# Patient Record
Sex: MALE | Race: WHITE
[De-identification: names, ages, dates, MRNs, and addresses within clinical notes are randomized per-mention and may not be internally consistent; named-entity substitution may affect disease eponyms.]

---

## 2020-06-08 ENCOUNTER — APPOINTMENT (OUTPATIENT)
Dept: RADIOLOGY | Facility: HOSPITAL | Age: 65
End: 2020-06-08
Payer: COMMERCIAL

## 2020-06-08 ENCOUNTER — OUTPATIENT (OUTPATIENT)
Dept: OUTPATIENT SERVICES | Facility: HOSPITAL | Age: 65
LOS: 1 days | End: 2020-06-08
Payer: COMMERCIAL

## 2020-06-08 DIAGNOSIS — M25.512 PAIN IN LEFT SHOULDER: ICD-10-CM

## 2020-06-08 DIAGNOSIS — M25.511 PAIN IN RIGHT SHOULDER: ICD-10-CM

## 2020-06-08 PROBLEM — Z00.00 ENCOUNTER FOR PREVENTIVE HEALTH EXAMINATION: Status: ACTIVE | Noted: 2020-06-08

## 2020-06-08 PROCEDURE — 73030 X-RAY EXAM OF SHOULDER: CPT

## 2020-06-08 PROCEDURE — 73030 X-RAY EXAM OF SHOULDER: CPT | Mod: 26,50

## 2021-12-24 ENCOUNTER — EMERGENCY (EMERGENCY)
Facility: HOSPITAL | Age: 66
LOS: 1 days | Discharge: HOME | End: 2021-12-24
Attending: EMERGENCY MEDICINE
Payer: MEDICARE

## 2021-12-24 VITALS
HEART RATE: 63 BPM | WEIGHT: 182.98 LBS | OXYGEN SATURATION: 96 % | RESPIRATION RATE: 20 BRPM | SYSTOLIC BLOOD PRESSURE: 135 MMHG | HEIGHT: 69 IN | TEMPERATURE: 98 F | DIASTOLIC BLOOD PRESSURE: 76 MMHG

## 2021-12-24 LAB
ALBUMIN SERPL ELPH-MCNC: 4.8 G/DL — SIGNIFICANT CHANGE UP (ref 3.3–5)
ALP SERPL-CCNC: 69 U/L — SIGNIFICANT CHANGE UP (ref 40–120)
ALT FLD-CCNC: 17 U/L — SIGNIFICANT CHANGE UP (ref 10–45)
ANION GAP SERPL CALC-SCNC: 15 MMOL/L — SIGNIFICANT CHANGE UP (ref 5–17)
AST SERPL-CCNC: 21 U/L — SIGNIFICANT CHANGE UP (ref 10–40)
BASOPHILS # BLD AUTO: 0.03 K/UL — SIGNIFICANT CHANGE UP (ref 0–0.2)
BASOPHILS NFR BLD AUTO: 0.4 % — SIGNIFICANT CHANGE UP (ref 0–2)
BILIRUB SERPL-MCNC: 0.2 MG/DL — SIGNIFICANT CHANGE UP (ref 0.2–1.2)
BUN SERPL-MCNC: 19 MG/DL — SIGNIFICANT CHANGE UP (ref 7–23)
CALCIUM SERPL-MCNC: 9.2 MG/DL — SIGNIFICANT CHANGE UP (ref 8.4–10.5)
CHLORIDE SERPL-SCNC: 107 MMOL/L — SIGNIFICANT CHANGE UP (ref 96–108)
CO2 SERPL-SCNC: 19 MMOL/L — LOW (ref 22–31)
CREAT SERPL-MCNC: 1.23 MG/DL — SIGNIFICANT CHANGE UP (ref 0.5–1.3)
CRP SERPL-MCNC: 11 MG/L — HIGH (ref 0–4)
EOSINOPHIL # BLD AUTO: 0.09 K/UL — SIGNIFICANT CHANGE UP (ref 0–0.5)
EOSINOPHIL NFR BLD AUTO: 1.3 % — SIGNIFICANT CHANGE UP (ref 0–6)
GLUCOSE SERPL-MCNC: 84 MG/DL — SIGNIFICANT CHANGE UP (ref 70–99)
HCT VFR BLD CALC: 42.1 % — SIGNIFICANT CHANGE UP (ref 39–50)
HGB BLD-MCNC: 13.8 G/DL — SIGNIFICANT CHANGE UP (ref 13–17)
IMM GRANULOCYTES NFR BLD AUTO: 0.1 % — SIGNIFICANT CHANGE UP (ref 0–1.5)
LYMPHOCYTES # BLD AUTO: 2.28 K/UL — SIGNIFICANT CHANGE UP (ref 1–3.3)
LYMPHOCYTES # BLD AUTO: 34.1 % — SIGNIFICANT CHANGE UP (ref 13–44)
MCHC RBC-ENTMCNC: 28.9 PG — SIGNIFICANT CHANGE UP (ref 27–34)
MCHC RBC-ENTMCNC: 32.8 GM/DL — SIGNIFICANT CHANGE UP (ref 32–36)
MCV RBC AUTO: 88.1 FL — SIGNIFICANT CHANGE UP (ref 80–100)
MONOCYTES # BLD AUTO: 0.49 K/UL — SIGNIFICANT CHANGE UP (ref 0–0.9)
MONOCYTES NFR BLD AUTO: 7.3 % — SIGNIFICANT CHANGE UP (ref 2–14)
NEUTROPHILS # BLD AUTO: 3.79 K/UL — SIGNIFICANT CHANGE UP (ref 1.8–7.4)
NEUTROPHILS NFR BLD AUTO: 56.8 % — SIGNIFICANT CHANGE UP (ref 43–77)
NRBC # BLD: 0 /100 WBCS — SIGNIFICANT CHANGE UP (ref 0–0)
PLATELET # BLD AUTO: 306 K/UL — SIGNIFICANT CHANGE UP (ref 150–400)
POTASSIUM SERPL-MCNC: 5.1 MMOL/L — SIGNIFICANT CHANGE UP (ref 3.5–5.3)
POTASSIUM SERPL-SCNC: 5.1 MMOL/L — SIGNIFICANT CHANGE UP (ref 3.5–5.3)
PROT SERPL-MCNC: 7.2 G/DL — SIGNIFICANT CHANGE UP (ref 6–8.3)
RBC # BLD: 4.78 M/UL — SIGNIFICANT CHANGE UP (ref 4.2–5.8)
RBC # FLD: 11.9 % — SIGNIFICANT CHANGE UP (ref 10.3–14.5)
SODIUM SERPL-SCNC: 141 MMOL/L — SIGNIFICANT CHANGE UP (ref 135–145)
WBC # BLD: 6.69 K/UL — SIGNIFICANT CHANGE UP (ref 3.8–10.5)
WBC # FLD AUTO: 6.69 K/UL — SIGNIFICANT CHANGE UP (ref 3.8–10.5)

## 2021-12-24 PROCEDURE — 99218: CPT

## 2021-12-24 PROCEDURE — 73630 X-RAY EXAM OF FOOT: CPT | Mod: 26,RT

## 2021-12-24 RX ORDER — ACETAMINOPHEN 500 MG
975 TABLET ORAL EVERY 6 HOURS
Refills: 0 | Status: DISCONTINUED | OUTPATIENT
Start: 2021-12-24 | End: 2021-12-28

## 2021-12-24 RX ORDER — KETOROLAC TROMETHAMINE 30 MG/ML
15 SYRINGE (ML) INJECTION ONCE
Refills: 0 | Status: DISCONTINUED | OUTPATIENT
Start: 2021-12-24 | End: 2021-12-24

## 2021-12-24 RX ORDER — CEFAZOLIN SODIUM 1 G
2000 VIAL (EA) INJECTION EVERY 8 HOURS
Refills: 0 | Status: DISCONTINUED | OUTPATIENT
Start: 2021-12-24 | End: 2021-12-24

## 2021-12-24 RX ORDER — CEFAZOLIN SODIUM 1 G
2000 VIAL (EA) INJECTION EVERY 8 HOURS
Refills: 0 | Status: DISCONTINUED | OUTPATIENT
Start: 2021-12-24 | End: 2021-12-28

## 2021-12-24 RX ADMIN — Medication 15 MILLIGRAM(S): at 23:33

## 2021-12-24 RX ADMIN — Medication 100 MILLIGRAM(S): at 22:55

## 2021-12-24 RX ADMIN — Medication 975 MILLIGRAM(S): at 23:33

## 2021-12-24 RX ADMIN — Medication 2000 MILLIGRAM(S): at 23:31

## 2021-12-24 NOTE — CONSULT NOTE ADULT - ASSESSMENT
66M s/p R foot 1st MPJ replacement 2 weeks ago in [place]  - pt seen and evaluated  - afebrile, no leukocytosis, ESR CRP pending  - Exam: R foot incision overlying 1st MPJ with no necrosis or dehiscence, right foot 1st interspace maceration noted with 2 sutures on the medial aspect of the 2nd digit at the level of the DIPJ with erythema., mild serous drainage and 1 suture noted on the lateral aspect of the hallux IPJ with periwound erythema, serous drainage, no malodor. Removal of sutures on 2nd digit revealed wound probing to capsule, no tunneling or tracking noted, on hallux s/p suture removal, wound probing to capsule as well, no tunneling undermining or tracking noted.   - R foot xray:  Patient status post first MTP joint replacement. The proximal grommet is displaced distally from the first metatarsal attachment site in all views. The underlying cortical margin is irregular. The distal grommet  is displaced proximally from the proximal phalanx attachemetn site in all views. The underlying cortical margins are sharp. No tracking subcutaneous gas. Given cortical irregularity at the distal first metatarsal margin, there is concern for underlying osteomyelitis.  - verbal consent obtained for removal of sutures in interspace  - using sterile suture removal kit, 2 sutures from the medial aspect of R 2nd digit and 1 suture from lateral aspect of R hallux were removed  - Wound were flushed with copious amount of normal saline  - wound culture was taken  - recommend admit to CDU  - recommend IV antibiotics  - ordered RF MRI to rule out underlying OM  - patient will most likely need a revisional surgery after resolution of cellulitis, had a detailed discussion with patient of diff treatment options, patient demonstrated understanding of all options and prognosis and agrees with plan  - discussed with attending

## 2021-12-24 NOTE — ED ADULT NURSE NOTE - PRIMARY CARE PROVIDER
Detail Level: Simple
Introduction Text (Please End With A Colon): The following procedure was deferred:
unknown

## 2021-12-24 NOTE — ED PROVIDER NOTE - PROGRESS NOTE DETAILS
Umer-PGY3: pt received at sign-out, seen and evaluated at bedside.  Discussed with podiatry re: consult, pending recs. Umer-PGY3: discussed with podiatry, recommending MRI foot r/o osteo as XR may show post-surgical changes. Discussed with CDU PA, pending eval.

## 2021-12-24 NOTE — ED ADULT TRIAGE NOTE - CHIEF COMPLAINT QUOTE
Foul smelling discharge and pain in R foot second toe s/p MTP joint replacement 2 weeks ago in [place].  Took Tylenol and Advil for pain at 12:00 with minimal relief

## 2021-12-24 NOTE — ED PROVIDER NOTE - CLINICAL SUMMARY MEDICAL DECISION MAKING FREE TEXT BOX
65 y/o male with no pmhx presenting with foul smelling discharge and pain in R foot second toe s/p MTP joint replacement 2 weeks ago in [place] with associated swelling and erythema; yellow discharge in between digits at surgical site, no crepitus. X-ray for low suspicion of osteomyelitis vs nec fasc. Basic labs with ESR/CRP, podiatry consult and reassess

## 2021-12-24 NOTE — ED PROVIDER NOTE - ATTENDING CONTRIBUTION TO CARE
Patient presenting with R great toe redness and foul smelling drainage.  Had recent 1st MTPJ replacement in [place], now developing above symptoms, no podiatrist/orthopedist in [place].  No systemic symptoms.  On exam patient L great toe erythematous/swollen, skin break down between 1st and 2nd toe in web space with purulent drainage.  Will obtain plain films, ESR/CRP, consult podiatry.  Suspect with need antibiotics for cellulitis if no further intervention indicated per podiatry recs.

## 2021-12-24 NOTE — ED PROVIDER NOTE - OBJECTIVE STATEMENT
65 y/o male with no pmhx presenting with right toe infection. Reports foul smelling discharge and pain in R foot second toe s/p MTP joint replacement 2 weeks ago in [place] with associated swelling and erythema. Took Tylenol and Advil for pain at 12:00 with minimal relief. No fevers/chills, n/v/d, cough. Ambulates without difficulty

## 2021-12-24 NOTE — CONSULT NOTE ADULT - SUBJECTIVE AND OBJECTIVE BOX
Patient is a 66y old  Male who presents with a chief complaint of     HPI:      PAST MEDICAL & SURGICAL HISTORY:      MEDICATIONS  (STANDING):  ceFAZolin   IVPB 2000 milliGRAM(s) IV Intermittent every 8 hours    MEDICATIONS  (PRN):      Allergies    No Known Allergies    Intolerances        VITALS:    Vital Signs Last 24 Hrs  T(C): 36.6 (24 Dec 2021 18:52), Max: 36.6 (24 Dec 2021 15:55)  T(F): 97.8 (24 Dec 2021 18:52), Max: 97.9 (24 Dec 2021 15:55)  HR: 68 (24 Dec 2021 22:58) (63 - 68)  BP: 160/99 (24 Dec 2021 22:58) (135/76 - 160/99)  BP(mean): --  RR: 18 (24 Dec 2021 22:58) (18 - 20)  SpO2: 98% (24 Dec 2021 22:58) (96% - 98%)    LABS:                          13.8   6.69  )-----------( 306      ( 24 Dec 2021 18:54 )             42.1       12-24    141  |  107  |  19  ----------------------------<  84  5.1   |  19<L>  |  1.23    Ca    9.2      24 Dec 2021 18:54    TPro  7.2  /  Alb  4.8  /  TBili  0.2  /  DBili  x   /  AST  21  /  ALT  17  /  AlkPhos  69  12-24      CAPILLARY BLOOD GLUCOSE              LOWER EXTREMITY PHYSICAL EXAM:    Vascular: DP/PT 2/4, B/L, CFT <3 seconds B/L, Temperature gradient warm to warm, B/L.   Neuro: Epicritic sensation intact to the level of digits, B/L.  Musculoskeletal/Ortho: R foot edema from hallux and 2nd digit  Skin: R foot incision overlying 1st MPJ with no necrosis or dehiscence, right foot 1st interspace maceration noted with 2 sutures on the medial aspect of the 2nd digit at the level of the DIPJ with erythema., mild serous drainage and 1 suture noted on the lateral aspect of the hallux IPJ with periwound erythema, serous drainage, no malodor. Removal of sutures on 2nd digit revealed wound probing to capsule, no tunneling or tracking noted, on hallux s/p suture removal, wound probing to capsule as well, no tunneling undermining or tracking noted.       RADIOLOGY & ADDITIONAL STUDIES:  < from: Xray Foot AP + Lateral + Oblique, Right (12.24.21 @ 19:14) >  ******PRELIMINARY REPORT******      ******PRELIMINARY REPORT******       ACC: [id number] EXAM:  XR FOOT COMP MIN 3 VIEWS RT                          PROCEDURE DATE:  12/24/2021    ******PRELIMINARY REPORT******      ******PRELIMINARY REPORT******           INTERPRETATION:  Patient status post first MTP joint replacement.  The proximal grommet is displaced distally from the first metatarsal   attachement site in all views. The underlying cortical margin is   irregular.  The distal grommet  is displaced proximally from the proximal phalanx   attachemetn site in all views. The underlying cortical margins are sharp.  No tracking subcutaneous gas.  Given cortical irregularity at the distal first metatarsal margin, there   is concern for underlyingosteomyelitis.    ADDENDUM: Additonally, there is cortical irregularity/erosion at the   medial second IP joint on the AP view.        ******PRELIMINARY REPORT******      ******PRELIMINARY REPORT******       LÁZARO OLIVEIRA MD; Resident Radiology    < end of copied text >     Patient is a 66y old  Male who presents with a chief complaint of right foot pain    HPI:  67 y/o male with no pmhx presenting with right toe infection. Reports foul smelling discharge and pain in R foot second toe s/p MTP joint replacement 2 weeks ago in [place] with associated swelling and erythema. Took Tylenol and Advil for pain at 12:00 with minimal relief. No fevers/chills, n/v/d, cough. Ambulates without difficulty    PAST MEDICAL & SURGICAL HISTORY:      MEDICATIONS  (STANDING):  ceFAZolin   IVPB 2000 milliGRAM(s) IV Intermittent every 8 hours    MEDICATIONS  (PRN):      Allergies    No Known Allergies    Intolerances        VITALS:    Vital Signs Last 24 Hrs  T(C): 36.6 (24 Dec 2021 18:52), Max: 36.6 (24 Dec 2021 15:55)  T(F): 97.8 (24 Dec 2021 18:52), Max: 97.9 (24 Dec 2021 15:55)  HR: 68 (24 Dec 2021 22:58) (63 - 68)  BP: 160/99 (24 Dec 2021 22:58) (135/76 - 160/99)  BP(mean): --  RR: 18 (24 Dec 2021 22:58) (18 - 20)  SpO2: 98% (24 Dec 2021 22:58) (96% - 98%)    LABS:                          13.8   6.69  )-----------( 306      ( 24 Dec 2021 18:54 )             42.1       12-24    141  |  107  |  19  ----------------------------<  84  5.1   |  19<L>  |  1.23    Ca    9.2      24 Dec 2021 18:54    TPro  7.2  /  Alb  4.8  /  TBili  0.2  /  DBili  x   /  AST  21  /  ALT  17  /  AlkPhos  69  12-24      CAPILLARY BLOOD GLUCOSE              LOWER EXTREMITY PHYSICAL EXAM:    Vascular: DP/PT 2/4, B/L, CFT <3 seconds B/L, Temperature gradient warm to warm, B/L.   Neuro: Epicritic sensation intact to the level of digits, B/L.  Musculoskeletal/Ortho: R foot edema from hallux and 2nd digit  Skin: R foot incision overlying 1st MPJ with no necrosis or dehiscence, right foot 1st interspace maceration noted with 2 sutures on the medial aspect of the 2nd digit at the level of the DIPJ with erythema., mild serous drainage and 1 suture noted on the lateral aspect of the hallux IPJ with periwound erythema, serous drainage, no malodor. Removal of sutures on 2nd digit revealed wound probing to capsule, no tunneling or tracking noted, on hallux s/p suture removal, wound probing to capsule as well, no tunneling undermining or tracking noted.       RADIOLOGY & ADDITIONAL STUDIES:  < from: Xray Foot AP + Lateral + Oblique, Right (12.24.21 @ 19:14) >  ******PRELIMINARY REPORT******      ******PRELIMINARY REPORT******       ACC: [id number] EXAM:  XR FOOT COMP MIN 3 VIEWS RT                          PROCEDURE DATE:  12/24/2021    ******PRELIMINARY REPORT******      ******PRELIMINARY REPORT******           INTERPRETATION:  Patient status post first MTP joint replacement.  The proximal grommet is displaced distally from the first metatarsal   attachement site in all views. The underlying cortical margin is   irregular.  The distal grommet  is displaced proximally from the proximal phalanx   attachemetn site in all views. The underlying cortical margins are sharp.  No tracking subcutaneous gas.  Given cortical irregularity at the distal first metatarsal margin, there   is concern for underlyingosteomyelitis.    ADDENDUM: Additonally, there is cortical irregularity/erosion at the   medial second IP joint on the AP view.        ******PRELIMINARY REPORT******      ******PRELIMINARY REPORT******       LÁZARO OLIVEIRA MD; Resident Radiology    < end of copied text >

## 2021-12-25 DIAGNOSIS — M20.21 HALLUX RIGIDUS, RIGHT FOOT: Chronic | ICD-10-CM

## 2021-12-25 LAB
ALBUMIN SERPL ELPH-MCNC: 4 G/DL — SIGNIFICANT CHANGE UP (ref 3.3–5)
ALP SERPL-CCNC: 59 U/L — SIGNIFICANT CHANGE UP (ref 40–120)
ALT FLD-CCNC: 15 U/L — SIGNIFICANT CHANGE UP (ref 10–45)
ANION GAP SERPL CALC-SCNC: 12 MMOL/L — SIGNIFICANT CHANGE UP (ref 5–17)
AST SERPL-CCNC: 14 U/L — SIGNIFICANT CHANGE UP (ref 10–40)
BASOPHILS # BLD AUTO: 0.03 K/UL — SIGNIFICANT CHANGE UP (ref 0–0.2)
BASOPHILS NFR BLD AUTO: 0.5 % — SIGNIFICANT CHANGE UP (ref 0–2)
BILIRUB SERPL-MCNC: 0.3 MG/DL — SIGNIFICANT CHANGE UP (ref 0.2–1.2)
BUN SERPL-MCNC: 19 MG/DL — SIGNIFICANT CHANGE UP (ref 7–23)
CALCIUM SERPL-MCNC: 9 MG/DL — SIGNIFICANT CHANGE UP (ref 8.4–10.5)
CHLORIDE SERPL-SCNC: 107 MMOL/L — SIGNIFICANT CHANGE UP (ref 96–108)
CO2 SERPL-SCNC: 21 MMOL/L — LOW (ref 22–31)
CREAT SERPL-MCNC: 1.05 MG/DL — SIGNIFICANT CHANGE UP (ref 0.5–1.3)
CRP SERPL-MCNC: 9 MG/L — HIGH (ref 0–4)
EOSINOPHIL # BLD AUTO: 0.07 K/UL — SIGNIFICANT CHANGE UP (ref 0–0.5)
EOSINOPHIL NFR BLD AUTO: 1.2 % — SIGNIFICANT CHANGE UP (ref 0–6)
ERYTHROCYTE [SEDIMENTATION RATE] IN BLOOD: 46 MM/HR — HIGH (ref 0–20)
ERYTHROCYTE [SEDIMENTATION RATE] IN BLOOD: 49 MM/HR — HIGH (ref 0–20)
ERYTHROCYTE [SEDIMENTATION RATE] IN BLOOD: 49 MM/HR — HIGH (ref 0–20)
GLUCOSE SERPL-MCNC: 105 MG/DL — HIGH (ref 70–99)
HCT VFR BLD CALC: 38.3 % — LOW (ref 39–50)
HGB BLD-MCNC: 12.8 G/DL — LOW (ref 13–17)
IMM GRANULOCYTES NFR BLD AUTO: 0.2 % — SIGNIFICANT CHANGE UP (ref 0–1.5)
LYMPHOCYTES # BLD AUTO: 1.62 K/UL — SIGNIFICANT CHANGE UP (ref 1–3.3)
LYMPHOCYTES # BLD AUTO: 27.5 % — SIGNIFICANT CHANGE UP (ref 13–44)
MCHC RBC-ENTMCNC: 29 PG — SIGNIFICANT CHANGE UP (ref 27–34)
MCHC RBC-ENTMCNC: 33.4 GM/DL — SIGNIFICANT CHANGE UP (ref 32–36)
MCV RBC AUTO: 86.7 FL — SIGNIFICANT CHANGE UP (ref 80–100)
MONOCYTES # BLD AUTO: 0.41 K/UL — SIGNIFICANT CHANGE UP (ref 0–0.9)
MONOCYTES NFR BLD AUTO: 6.9 % — SIGNIFICANT CHANGE UP (ref 2–14)
NEUTROPHILS # BLD AUTO: 3.76 K/UL — SIGNIFICANT CHANGE UP (ref 1.8–7.4)
NEUTROPHILS NFR BLD AUTO: 63.7 % — SIGNIFICANT CHANGE UP (ref 43–77)
NRBC # BLD: 0 /100 WBCS — SIGNIFICANT CHANGE UP (ref 0–0)
PLATELET # BLD AUTO: 267 K/UL — SIGNIFICANT CHANGE UP (ref 150–400)
POTASSIUM SERPL-MCNC: 4.1 MMOL/L — SIGNIFICANT CHANGE UP (ref 3.5–5.3)
POTASSIUM SERPL-SCNC: 4.1 MMOL/L — SIGNIFICANT CHANGE UP (ref 3.5–5.3)
PROT SERPL-MCNC: 6.7 G/DL — SIGNIFICANT CHANGE UP (ref 6–8.3)
RBC # BLD: 4.42 M/UL — SIGNIFICANT CHANGE UP (ref 4.2–5.8)
RBC # FLD: 11.9 % — SIGNIFICANT CHANGE UP (ref 10.3–14.5)
SARS-COV-2 RNA SPEC QL NAA+PROBE: SIGNIFICANT CHANGE UP
SODIUM SERPL-SCNC: 140 MMOL/L — SIGNIFICANT CHANGE UP (ref 135–145)
WBC # BLD: 5.9 K/UL — SIGNIFICANT CHANGE UP (ref 3.8–10.5)
WBC # FLD AUTO: 5.9 K/UL — SIGNIFICANT CHANGE UP (ref 3.8–10.5)

## 2021-12-25 PROCEDURE — 73720 MRI LWR EXTREMITY W/O&W/DYE: CPT | Mod: 26,RT,ME

## 2021-12-25 PROCEDURE — G1004: CPT

## 2021-12-25 PROCEDURE — 99226: CPT

## 2021-12-25 RX ORDER — KETOROLAC TROMETHAMINE 30 MG/ML
15 SYRINGE (ML) INJECTION ONCE
Refills: 0 | Status: DISCONTINUED | OUTPATIENT
Start: 2021-12-25 | End: 2021-12-25

## 2021-12-25 RX ORDER — MUPIROCIN 20 MG/G
1 OINTMENT TOPICAL ONCE
Refills: 0 | Status: COMPLETED | OUTPATIENT
Start: 2021-12-25 | End: 2021-12-25

## 2021-12-25 RX ADMIN — Medication 100 MILLIGRAM(S): at 13:54

## 2021-12-25 RX ADMIN — Medication 15 MILLIGRAM(S): at 05:17

## 2021-12-25 RX ADMIN — Medication 100 MILLIGRAM(S): at 06:01

## 2021-12-25 RX ADMIN — Medication 15 MILLIGRAM(S): at 13:53

## 2021-12-25 RX ADMIN — Medication 2000 MILLIGRAM(S): at 06:31

## 2021-12-25 RX ADMIN — Medication 15 MILLIGRAM(S): at 00:03

## 2021-12-25 RX ADMIN — Medication 975 MILLIGRAM(S): at 00:03

## 2021-12-25 RX ADMIN — Medication 15 MILLIGRAM(S): at 06:51

## 2021-12-25 RX ADMIN — Medication 15 MILLIGRAM(S): at 21:00

## 2021-12-25 RX ADMIN — Medication 15 MILLIGRAM(S): at 20:30

## 2021-12-25 RX ADMIN — Medication 100 MILLIGRAM(S): at 22:16

## 2021-12-25 RX ADMIN — MUPIROCIN 1 APPLICATION(S): 20 OINTMENT TOPICAL at 05:16

## 2021-12-25 NOTE — ED CDU PROVIDER SUBSEQUENT DAY NOTE - PROGRESS NOTE DETAILS
CDU NOTE SAGRARIO Gamez: VSS NAD. Patient is resting comfortably reports improved redness and pain. Seen my podiatry this am who agreed with plan to continue IV abx and MRI for further eval. Pt will likely need joint revision in furture Attending Elba: pt seen and examined on AM rounds w/ PA. Well appearing, VSS. Pt reporting symptoms have improved since yesterday. Podiatry resident at bedside to evaluate wound, feels erythema is improving. Pt to go MRI now. Dispo pending MR results. Pt MRI resulted with "Findings of septic arthritis/osteomyelitis at the first digit interphalangeal joint and at the second digit proximal interphalangeal joint, with overlying soft tissue ulceration and phlegmon. No drainable abscess." Additional findings suggestive of post-op changes. Spoke to Podiatry resident who reviewed images with their attending. States they do not feel patient has septic joint or osteo given clinical improvement of cellulitis and pain with Ancef. Request second night in observation with plan to reevaluate in am and likely d/c home w/ PO abx if pt continues to improve clinically.   Alexa Gamez PA-C Pt in NAD. VS stable from last reading.  Will continue to monitor. Pt seen at bedside. Pt in NAD, comfortable. VS stable from last reading. Pt has no complaints at this time, reports his R hallux/foot pain has improved since arrival. Pt NVI. Will continue to monitor. Pt aware of plan for overnight observation with podiatry reeval in the AM.

## 2021-12-25 NOTE — ED CDU PROVIDER SUBSEQUENT DAY NOTE - PHYSICAL EXAMINATION
GEN: Pt in NAD. Non-toxic.  PSYCH: Affect appropriate.  EYES: Sclera white w/o injection.   ENT: Head NCAT. Neck supple FROM.  RESP: CTA b/l  CARDIAC: RRR  ABD: Abdomen soft, non-tender.  MSK: FROM b/l LE without pain.   VASC: 2+ radial and dorsalis pedis pulses b/l. No edema or calf tenderness.  NEURO: Normal and equal sensation and 5/5 strength b/l LE.  SKIN: Incision overlying 1st MTP joint, no dehiscence. +maceration of R 1st web space and lateral aspect of 2nd digit. Non-malodorous serous drainage without purulence. Mild overlying erythema of the hallux extending to the region ot the 1st MTP, no crepitus or notable induration/fluctuance, R hallux and 2nd digit TTP. GEN: Pt in NAD. Non-toxic.  PSYCH: Affect appropriate.  EYES: Sclera white w/o injection.   ENT: Head NCAT. Neck supple FROM.  RESP: CTA b/l  CARDIAC: RRR  ABD: Abdomen soft, non-tender.  MSK: FROM b/l LE without pain.   VASC: 2+ radial and dorsalis pedis pulses b/l. No edema or calf tenderness.  NEURO: Normal and equal sensation and 5/5 strength b/l LE.  SKIN: Incision overlying 1st MTP joint, no dehiscence. Incision on medial aspect of hallux with dehiscence. +maceration of R 1st web space and lateral aspect of 2nd digit. Non-malodorous serous drainage without purulence. Mild overlying erythema of the hallux extending to the region of the 1st MTP, no crepitus or notable induration/fluctuance, R hallux and 2nd digit TTP.

## 2021-12-25 NOTE — ED CDU PROVIDER DISPOSITION NOTE - CLINICAL COURSE
65 y/o male with no pmhx presenting with right toe infection. Reports foul smelling discharge and pain in R foot second toe s/p MTP joint replacement 2 weeks ago in [place] at [facility] with associated swelling and erythema. Took Tylenol and Advil for pain at 12:00 with minimal relief. No fevers/chills, n/v/d, cough. Ambulates with pain, using crutches for the past 3 days for ease of ambulation.  ED Course: WBC 6.69, ESR 49, CRP 11, remainder of labs non-actionable. XR foot: "cortical irregularity at the distal first metatarsal margin, there is concern for underlying osteomyelitis. Additionally, there is cortical irregularity/erosion at the medial second IP joint on the AP view." Pt was evaluated by podiatry that feels the radiograph may be displaying post-surgical changes, requesting IV abx, CDU for continued abx, frequent eval, pain control, MRI r/o AOM.  CDU Course: MRI showed____. Podiatry recommended____. 65 y/o male with no pmhx presenting with right toe infection. Reports foul smelling discharge and pain in R foot second toe s/p MTP joint replacement 2 weeks ago in [place] at [facility] with associated swelling and erythema. Took Tylenol and Advil for pain at 12:00 with minimal relief. No fevers/chills, n/v/d, cough. Ambulates with pain, using crutches for the past 3 days for ease of ambulation.  ED Course: WBC 6.69, ESR 49, CRP 11, remainder of labs non-actionable. XR foot: "cortical irregularity at the distal first metatarsal margin, there is concern for underlying osteomyelitis. Additionally, there is cortical irregularity/erosion at the medial second IP joint on the AP view." Pt was evaluated by podiatry that feels the radiograph may be displaying post-surgical changes, requesting IV abx, CDU for continued abx, frequent eval, pain control, MRI r/o AOM.  CDU Course: Patient reports that he is feeling much better.  Erythema has significantly improved. MRI concerning for OM/septic arthritis. Abscess culture prelim growing pseudomonas.  Podiatry recommending DC home on keflex and add levaquin. MRI findings may be 2/2 postsurgical changes.  Patient to follow up with his podiatrist on tuesday.  Strict follow up and return precautions provided

## 2021-12-25 NOTE — ED CDU PROVIDER INITIAL DAY NOTE - PHYSICAL EXAMINATION
GEN: Pt in NAD, A&O x3. Non-toxic.  PSYCH: Affect appropriate.  EYES: Sclera white w/o injection.   ENT: Head NCAT. MMM. Neck supple FROM.  RESP: CTA b/l  CARDIAC: RRR, clear distinct S1, S2, no appreciable murmurs.  ABD: Abdomen soft, non-tender.  MSK: FROM b/l LE without pain.   VASC: 2+ radial and dorsalis pedis pulses b/l. No edema or calf tenderness.  NEURO: Normal and equal sensation and 5/5 strength b/l LE.  SKIN: Incision overlying 1st MTP joint, no dehiscence. +maceration of R 1st web space and lateral aspect of 2nd digit. Non-malodorous serous drainage without purulence. Mild overlying erythema of the hallux extending to the region ot the 1st MTP, no crepitus or notable induration/fluctuance, R hallux and 2nd digit TTP.

## 2021-12-25 NOTE — ED CDU PROVIDER DISPOSITION NOTE - NSFOLLOWUPCLINICS_GEN_ALL_ED_FT
[facility]  Podiatry  [address] [place] [address] - [address] [place] [address]  Phone: [phone]  Fax:

## 2021-12-25 NOTE — ED CDU PROVIDER INITIAL DAY NOTE - ATTENDING CONTRIBUTION TO CARE
Post op foot cellulitis +/- underlying osteomyelitis by XR - start antibiotics for cellulitis, MRI to further evaluate possible osteo, podiatry following.

## 2021-12-25 NOTE — ED CDU PROVIDER INITIAL DAY NOTE - ASSESSMENT PLAN
Patient is calling regarding bronchitis. He is coughing,chest congestion. He would like to know if Dr will call something in or if he needs to be seen or if Dr Gutierrez has any other suggestions. Please call  [phone] (M)   Antibiotic Administration/MRI/Pain Control

## 2021-12-25 NOTE — ED CDU PROVIDER DISPOSITION NOTE - PATIENT PORTAL LINK FT
You can access the FollowMyHealth Patient Portal offered by [facility] by registering at the following website: [email or web address]. By joining [employer]’s FollowMyHealth portal, you will also be able to view your health information using other applications (apps) compatible with our system.

## 2021-12-25 NOTE — ED CDU PROVIDER INITIAL DAY NOTE - OBJECTIVE STATEMENT
65 y/o male with no pmhx presenting with right toe infection. Reports foul smelling discharge and pain in R foot second toe s/p MTP joint replacement 2 weeks ago in [place] at [facility] with associated swelling and erythema. Took Tylenol and Advil for pain at 12:00 with minimal relief. No fevers/chills, n/v/d, cough. Ambulates with pain, using crutches for the past 3 days for ease of ambulation.  ED Course: WBC 6.69, ESR 49, CRP 11, remainder of labs non-actionable. XR foot: "cortical irregularity at the distal first metatarsal margin, there is concern for underlying osteomyelitis. Additionally, there is cortical irregularity/erosion at the medial second IP joint on the AP view." Pt was evaluated by podiatry that feels the radiograph may be displaying post-surgical changes, requesting IV abx, CDU for continued abx, frequent eval, pain control, MRI r/o AOM.

## 2021-12-25 NOTE — ED CDU PROVIDER DISPOSITION NOTE - NSFOLLOWUPINSTRUCTIONS_ED_ALL_ED_FT
1. Follow-up with your PCP in 2-3 days.     Follow-up with podiatry in 1 week for further management.    2. Take keflex as prescribed. Finish the full course of your antibiotic, do not skip doses.    3. Rest and elevate affected area. Take Tylenol and Ibuprofen over the counter as directed as needed for pain.     ??oxycodone    4. Return to ED if you experience any worsening redness, swelling, streaking (red lines), fever, chills, or any other concerning symptoms. 1.  STay well hydrated  2.  Take Tylenol 650mgs every 4-6 hrs and/or Ibuprofen 600mgs every 6 hrs as needed for pain  3.  Take Keflex 500mgs every 6 hrs x 7 days and Levaquin 750mgs x 7 days  4.  Follow up with your PMD in 2-3 days.  Bring a copy of your results with you to your appointment  5.  Follow up with your podiatrist on tuesday as previously scheduled  6.  Return to the ER for worsening pain, fevers, streaking redness or any other concerning symptoms

## 2021-12-26 VITALS
OXYGEN SATURATION: 98 % | HEART RATE: 62 BPM | RESPIRATION RATE: 18 BRPM | DIASTOLIC BLOOD PRESSURE: 85 MMHG | SYSTOLIC BLOOD PRESSURE: 121 MMHG | TEMPERATURE: 98 F

## 2021-12-26 LAB
ALBUMIN SERPL ELPH-MCNC: 3.9 G/DL — SIGNIFICANT CHANGE UP (ref 3.3–5)
ALP SERPL-CCNC: 58 U/L — SIGNIFICANT CHANGE UP (ref 40–120)
ALT FLD-CCNC: 10 U/L — SIGNIFICANT CHANGE UP (ref 10–45)
ANION GAP SERPL CALC-SCNC: 11 MMOL/L — SIGNIFICANT CHANGE UP (ref 5–17)
AST SERPL-CCNC: 15 U/L — SIGNIFICANT CHANGE UP (ref 10–40)
BILIRUB SERPL-MCNC: 0.3 MG/DL — SIGNIFICANT CHANGE UP (ref 0.2–1.2)
BUN SERPL-MCNC: 19 MG/DL — SIGNIFICANT CHANGE UP (ref 7–23)
CALCIUM SERPL-MCNC: 9.6 MG/DL — SIGNIFICANT CHANGE UP (ref 8.4–10.5)
CHLORIDE SERPL-SCNC: 108 MMOL/L — SIGNIFICANT CHANGE UP (ref 96–108)
CO2 SERPL-SCNC: 21 MMOL/L — LOW (ref 22–31)
CREAT SERPL-MCNC: 0.97 MG/DL — SIGNIFICANT CHANGE UP (ref 0.5–1.3)
ERYTHROCYTE [SEDIMENTATION RATE] IN BLOOD: 31 MM/HR — HIGH (ref 0–20)
GLUCOSE SERPL-MCNC: 110 MG/DL — HIGH (ref 70–99)
HCT VFR BLD CALC: 37.5 % — LOW (ref 39–50)
HGB BLD-MCNC: 12.5 G/DL — LOW (ref 13–17)
MCHC RBC-ENTMCNC: 28.9 PG — SIGNIFICANT CHANGE UP (ref 27–34)
MCHC RBC-ENTMCNC: 33.3 GM/DL — SIGNIFICANT CHANGE UP (ref 32–36)
MCV RBC AUTO: 86.6 FL — SIGNIFICANT CHANGE UP (ref 80–100)
NRBC # BLD: 0 /100 WBCS — SIGNIFICANT CHANGE UP (ref 0–0)
PLATELET # BLD AUTO: 243 K/UL — SIGNIFICANT CHANGE UP (ref 150–400)
POTASSIUM SERPL-MCNC: 4.1 MMOL/L — SIGNIFICANT CHANGE UP (ref 3.5–5.3)
POTASSIUM SERPL-SCNC: 4.1 MMOL/L — SIGNIFICANT CHANGE UP (ref 3.5–5.3)
PROT SERPL-MCNC: 6.7 G/DL — SIGNIFICANT CHANGE UP (ref 6–8.3)
RBC # BLD: 4.33 M/UL — SIGNIFICANT CHANGE UP (ref 4.2–5.8)
RBC # FLD: 11.7 % — SIGNIFICANT CHANGE UP (ref 10.3–14.5)
SODIUM SERPL-SCNC: 140 MMOL/L — SIGNIFICANT CHANGE UP (ref 135–145)
WBC # BLD: 5.1 K/UL — SIGNIFICANT CHANGE UP (ref 3.8–10.5)
WBC # FLD AUTO: 5.1 K/UL — SIGNIFICANT CHANGE UP (ref 3.8–10.5)

## 2021-12-26 PROCEDURE — 87077 CULTURE AEROBIC IDENTIFY: CPT

## 2021-12-26 PROCEDURE — 87040 BLOOD CULTURE FOR BACTERIA: CPT

## 2021-12-26 PROCEDURE — U0003: CPT

## 2021-12-26 PROCEDURE — 96375 TX/PRO/DX INJ NEW DRUG ADDON: CPT | Mod: XU

## 2021-12-26 PROCEDURE — 73720 MRI LWR EXTREMITY W/O&W/DYE: CPT | Mod: ME

## 2021-12-26 PROCEDURE — 96365 THER/PROPH/DIAG IV INF INIT: CPT | Mod: XU

## 2021-12-26 PROCEDURE — G1004: CPT

## 2021-12-26 PROCEDURE — 87186 SC STD MICRODIL/AGAR DIL: CPT

## 2021-12-26 PROCEDURE — 99283 EMERGENCY DEPT VISIT LOW MDM: CPT | Mod: 25

## 2021-12-26 PROCEDURE — 96366 THER/PROPH/DIAG IV INF ADDON: CPT | Mod: XU

## 2021-12-26 PROCEDURE — 85025 COMPLETE CBC W/AUTO DIFF WBC: CPT

## 2021-12-26 PROCEDURE — 36415 COLL VENOUS BLD VENIPUNCTURE: CPT

## 2021-12-26 PROCEDURE — U0005: CPT

## 2021-12-26 PROCEDURE — 87205 SMEAR GRAM STAIN: CPT

## 2021-12-26 PROCEDURE — 87070 CULTURE OTHR SPECIMN AEROBIC: CPT

## 2021-12-26 PROCEDURE — G0378: CPT

## 2021-12-26 PROCEDURE — 87150 DNA/RNA AMPLIFIED PROBE: CPT

## 2021-12-26 PROCEDURE — A9585: CPT

## 2021-12-26 PROCEDURE — 80053 COMPREHEN METABOLIC PANEL: CPT

## 2021-12-26 PROCEDURE — 85652 RBC SED RATE AUTOMATED: CPT

## 2021-12-26 PROCEDURE — 99236 HOSP IP/OBS SAME DATE HI 85: CPT

## 2021-12-26 PROCEDURE — 86140 C-REACTIVE PROTEIN: CPT

## 2021-12-26 PROCEDURE — 73630 X-RAY EXAM OF FOOT: CPT

## 2021-12-26 PROCEDURE — 96376 TX/PRO/DX INJ SAME DRUG ADON: CPT

## 2021-12-26 RX ORDER — KETOROLAC TROMETHAMINE 30 MG/ML
15 SYRINGE (ML) INJECTION ONCE
Refills: 0 | Status: DISCONTINUED | OUTPATIENT
Start: 2021-12-26 | End: 2021-12-26

## 2021-12-26 RX ORDER — CIPROFLOXACIN LACTATE 400MG/40ML
1 VIAL (ML) INTRAVENOUS
Qty: 6 | Refills: 0
Start: 2021-12-26 | End: 2021-12-31

## 2021-12-26 RX ORDER — KETOROLAC TROMETHAMINE 30 MG/ML
15 SYRINGE (ML) INJECTION EVERY 6 HOURS
Refills: 0 | Status: DISCONTINUED | OUTPATIENT
Start: 2021-12-26 | End: 2021-12-26

## 2021-12-26 RX ORDER — CEPHALEXIN 500 MG
1 CAPSULE ORAL
Qty: 28 | Refills: 0
Start: 2021-12-26 | End: 2022-01-01

## 2021-12-26 RX ADMIN — Medication 15 MILLIGRAM(S): at 08:26

## 2021-12-26 RX ADMIN — Medication 975 MILLIGRAM(S): at 08:23

## 2021-12-26 RX ADMIN — Medication 100 MILLIGRAM(S): at 06:14

## 2021-12-26 RX ADMIN — Medication 15 MILLIGRAM(S): at 00:42

## 2021-12-26 RX ADMIN — Medication 975 MILLIGRAM(S): at 09:06

## 2021-12-26 RX ADMIN — Medication 15 MILLIGRAM(S): at 09:06

## 2021-12-26 RX ADMIN — Medication 15 MILLIGRAM(S): at 08:18

## 2021-12-26 RX ADMIN — Medication 15 MILLIGRAM(S): at 01:23

## 2021-12-26 NOTE — ED CDU PROVIDER SUBSEQUENT DAY NOTE - ATTENDING CONTRIBUTION TO CARE
Attending Elba: I have personally performed a face to face diagnostic evaluation on this patient.  I have reviewed the ACP note and agree with the history, exam, and plan of care, except as noted.   My medical decision making and observations are found above.
see MDM

## 2021-12-26 NOTE — ED CDU PROVIDER SUBSEQUENT DAY NOTE - PHYSICAL EXAMINATION
GEN: Pt in NAD. Non-toxic.  PSYCH: Affect appropriate.  EYES: Sclera white w/o injection.   ENT: Head NCAT. Neck supple FROM.  RESP: CTA b/l  CARDIAC: RRR  ABD: Abdomen soft, non-tender.  MSK: FROM b/l LE without pain.   VASC: 2+ radial and dorsalis pedis pulses b/l. No edema or calf tenderness.  NEURO: Normal and equal sensation and 5/5 strength b/l LE.  SKIN: Incision overlying R 1st MTP joint, no dehiscence. Incision on medial aspect of hallux with dehiscence. +maceration of R 1st web space and lateral aspect of 2nd digit. Non-malodorous serous drainage without purulence. Mild overlying erythema of the hallux extending to the region of the 1st MTP, no crepitus or notable induration/fluctuance, mild ttp of R hallux and 2nd digit.

## 2021-12-26 NOTE — ED ADULT NURSE REASSESSMENT NOTE - NS ED NURSE REASSESS COMMENT FT1
07.00 Am Received the Pt from  OMI Vargas  . Pt is Observed for  Rt foot infection  for MRI  . Received the Pt A&OX 4 obeys commands Trena N/V/D fever chills cp SOB   Comfort care & safety measures continued  IV site looks clean & dry no signs of infiltration noted pt denies  pain IV site .  Pt is advised to call for help  call bell with in the reach pt verbalized the understanding . Pt states  he has minimal pain in Rt foot 4/10 + Sutures on great toe/foot + redness + swelling  pending CDU  MD erazo . GCS 15/15 A&OX 4 PERRLA  size 3 Strong upper & lower extremities steady gait  Pt is using Crutches for ambulation   No facial droop  No Hand Leg drop denies numbness tingling Continue to monitor Pt completed MRI awaiting results
Podiatry at bedside
Pt amb with assist walker Denies any discomfort Levaquin given prior to d/c home
Pt received from OMI Kahn. Pt oriented to CDU & plan of care was discussed. Pt endorses 5/10 pain to R foot. Unable to visualize area d/t bandaging. Area is clean, dry & intact. Pt endorses minimal discomfort with ambulation. Pt using crutches. Safety & comfort measures maintained. Call bell in reach. Will continue to monitor.

## 2021-12-26 NOTE — ED CDU PROVIDER SUBSEQUENT DAY NOTE - NSICDXPASTSURGICALHX_GEN_ALL_CORE_FT
PAST SURGICAL HISTORY:  Rigidity of 1st MTP joint, right s/p 1st MTPJ replacement 2021    
PAST SURGICAL HISTORY:  Rigidity of 1st MTP joint, right s/p 1st MTPJ replacement 2021

## 2021-12-26 NOTE — PROGRESS NOTE ADULT - SUBJECTIVE AND OBJECTIVE BOX
Podiatry pager #: [phone] ([place])/ [phone] ([facility])    Patient is a 66y old  Male who presents with a chief complaint of      INTERVAL HPI/OVERNIGHT EVENTS:  Patient seen and evaluated at bedside.  Pt is resting comfortable in NAD. Denies N/V/F/C.     Allergies    No Known Allergies    Intolerances        Vital Signs Last 24 Hrs  T(C): 36.6 (26 Dec 2021 10:04), Max: 37 (25 Dec 2021 20:11)  T(F): 97.9 (26 Dec 2021 10:04), Max: 98.6 (25 Dec 2021 20:11)  HR: 67 (26 Dec 2021 10:04) (59 - 74)  BP: 138/81 (26 Dec 2021 10:04) (111/74 - 138/81)  BP(mean): --  RR: 18 (26 Dec 2021 10:04) (18 - 18)  SpO2: 97% (26 Dec 2021 10:04) (96% - 98%)    LABS:                        12.5   5.10  )-----------( 243      ( 26 Dec 2021 06:56 )             37.5     12-26    140  |  108  |  19  ----------------------------<  110<H>  4.1   |  21<L>  |  0.97    Ca    9.6      26 Dec 2021 06:56    TPro  6.7  /  Alb  3.9  /  TBili  0.3  /  DBili  x   /  AST  15  /  ALT  10  /  AlkPhos  58  12-26        CAPILLARY BLOOD GLUCOSE          Lower Extremity Physical Exam:  Vascular: DP/PT 2/4, B/L, CFT <3 seconds B/L, Temperature gradient warm to warm, B/L.   Neuro: Epicritic sensation intact to the level of digits, B/L.  Musculoskeletal/Ortho: R foot edema from hallux and 2nd digit  Skin:     < from: MR Foot w/wo IV Cont, Right (12.25.21 @ 09:08) >  IMPRESSION:  1.  Findings of septic arthritis/osteomyelitis at the first digit   interphalangeal joint and at the second digit proximal interphalangeal   joint, with overlying soft tissue ulceration and phlegmon. No drainable   abscess.    2.  Postsurgical changes at the first MTP joint, status post joint   replacement. Findings represent postsurgical marrow edema and   enhancement, if the surgical intervention was recent, however component   loosening and/or infection would have a similar appearance. Advise   correlation with a timing of surgery andbaseline pre/postoperative   radiographs.    --- End of Report ---            SHLOMIT A GOLDBERG-STEIN MD; Attending Radiologist  This document has been electronically signed. Dec 25 2021 11:55AM    < end of copied text >      RADIOLOGY & ADDITIONAL TESTS:  
Podiatry pager #: [phone] ([place])/ [phone] ([facility])    Patient is a 66y old  Male who presents with a chief complaint of      INTERVAL HPI/OVERNIGHT EVENTS:  Patient seen and evaluated at bedside.  Pt is resting comfortable in NAD. Denies N/V/F/C.     Allergies    No Known Allergies    Intolerances        Vital Signs Last 24 Hrs  T(C): 36.7 (25 Dec 2021 08:45), Max: 36.7 (24 Dec 2021 23:19)  T(F): 98 (25 Dec 2021 08:45), Max: 98 (24 Dec 2021 23:19)  HR: 67 (25 Dec 2021 08:45) (60 - 68)  BP: 113/97 (25 Dec 2021 08:45) (106/80 - 160/99)  BP(mean): --  RR: 18 (25 Dec 2021 08:45) (18 - 20)  SpO2: 100% (25 Dec 2021 08:45) (96% - 100%)    LABS:                        13.8   6.69  )-----------( 306      ( 24 Dec 2021 18:54 )             42.1     12-24    141  |  107  |  19  ----------------------------<  84  5.1   |  19<L>  |  1.23    Ca    9.2      24 Dec 2021 18:54    TPro  7.2  /  Alb  4.8  /  TBili  0.2  /  DBili  x   /  AST  21  /  ALT  17  /  AlkPhos  69  12-24        CAPILLARY BLOOD GLUCOSE          Lower Extremity Physical Exam:  Vascular: DP/PT 2/4, B/L, CFT <3 seconds B/L, Temperature gradient warm to warm, B/L.   Neuro: Epicritic sensation intact to the level of digits, B/L.  Musculoskeletal/Ortho: R foot edema from hallux and 2nd digit  Skin: R foot incision overlying 1st MPJ with no necrosis or dehiscence, right foot 1st interspace maceration noted with open suture sites on the medial aspect of the 2nd digit at the level of the DIPJ, no drainage and 1 suture noted on the lateral aspect of the hallux IPJ with periwound erythema, no drainage, no malodor , no fluctuance, probing to capsule, no tracking    RADIOLOGY & ADDITIONAL TESTS:

## 2021-12-26 NOTE — PROGRESS NOTE ADULT - ASSESSMENT
66M s/p R foot 1st MPJ replacement 2 weeks ago in [place] & cellulitis  - pt seen and evaluated  - afebrile, no leukocytosis  - R foot incision overlying 1st MPJ with no necrosis or dehiscence, right foot 1st interspace maceration noted with open suture sites on the medial aspect of the 2nd digit at the level of the DIPJ, no drainage and 1 suture noted on the lateral aspect of the hallux IPJ with periwound erythema, no drainage, no malodor , no fluctuance, probing to capsule, no tracking  - erythema/ cellulitis significantly improved  - continue  IV antibiotics  - ordered RF MRI to rule out underlying deep abscess  - patient will most likely need a revisional surgery after resolution of cellulitis, had a detailed discussion with patient of diff treatment options, patient demonstrated understanding of all options. pt would have great difficulty for follow up.  Pt is from [place] and had surgery in [place],  states had appt on Tuesday, recommend pt return to surgeon as soon as possible.   - pod plan recommend discharge on PO Augmentin 875 mg X 10 days and return to surgeon in [place] as soon as possible pending [place] MRI negative for deep abscess  - will follow   - discussed with attending
66M s/p R foot 1st MPJ replacement 2 weeks ago in [place] & cellulitis  - pt seen and evaluated  - afebrile, no leukocytosis  - R foot incision overlying 1st MPJ with no necrosis or dehiscence, right foot 1st interspace maceration noted with open suture sites on the medial aspect of the 2nd digit at the level of the DIPJ, no drainage, wound the lateral aspect of the hallux IPJ erythema resolved no drainage, no malodor , no fluctuance, probing to capsule, no tracking, cellulitis resolved  - IN ED R hallux IPJ aapirtaed using sterile #18 gauge syringe, no fluid noted  - erythema/ cellulitis resolved  -  RF MRI reviewed, chnages likely 2/2 to surgical procedures, low concern for OM septic joint  - patient will most likely need a revisional surgery after resolution of cellulitis, had a detailed discussion with patient of diff treatment options, patient demonstrated understanding of all options. pt would have great difficulty for follow up.    - Pt is from [place] and had surgery in [place], pt states had appt on Tuesday, recommend pt return to surgeon as soon as possible.   - stable for discharge from podiatry standpoint rec d/c on 1 week of PO Keflex and Levofloxacin  - discussed with attending

## 2021-12-26 NOTE — ED CDU PROVIDER SUBSEQUENT DAY NOTE - MEDICAL DECISION MAKING DETAILS
cellulitis v osteomyelitis R great toe - IV ABx, Podiatry consult and reassess.
Attending Elba: 65 y/o M w/ no sig PMH, recent R 1st toe surgery originally presented to ED w/ concern for foot infection. Initial team concerned for cellulitis w/ possible osteo. Was seen by podiatry who recommended pt be placed in CDU for abx and MR. Pt started on ancef for cellulitis coverage. No leukocytosis, afebrile. Will obtain MR, f/u pod recs, continue abx. Will reassess the need for additional interventions as clinically warranted.

## 2021-12-26 NOTE — ED CDU PROVIDER SUBSEQUENT DAY NOTE - PROGRESS NOTE DETAILS
pt states less pain in R great toe and no fever.  awaiting Podiatry recs.  pt has appointment in [facility] on Dec 28th but no flight back. Patient seen at bedside in NAD.  VSS.  Patient resting comfortably.  Patient reports that he is feeling much better.  Erythema has significantly improved.  Abscess culture prelim growing pseudomonas.  Podiatry recommending DC home on keflex and add levaquin.  Patient to follow up with his podiatrist on tuesday.  Strict follow up and return precautions provided.  -Navid Pearson PA-C

## 2021-12-26 NOTE — ED CDU PROVIDER SUBSEQUENT DAY NOTE - HISTORY
Pt sleeping. VS stable from last reading. Will continue to monitor.
Pt sleeping. VS stable from last reading. Will continue to monitor. Pt pending MRI foot. -Dalton Vo PA-C

## 2021-12-26 NOTE — ED CDU PROVIDER SUBSEQUENT DAY NOTE - NSICDXFAMILYHX_GEN_ALL_CORE_FT
FAMILY HISTORY:  No pertinent family history in first degree relatives
FAMILY HISTORY:  No pertinent family history in first degree relatives

## 2021-12-27 ENCOUNTER — EMERGENCY (EMERGENCY)
Facility: HOSPITAL | Age: 66
LOS: 1 days | Discharge: HOME | End: 2021-12-27
Attending: STUDENT IN AN ORGANIZED HEALTH CARE EDUCATION/TRAINING PROGRAM
Payer: MEDICARE

## 2021-12-27 VITALS
SYSTOLIC BLOOD PRESSURE: 143 MMHG | OXYGEN SATURATION: 99 % | DIASTOLIC BLOOD PRESSURE: 94 MMHG | HEIGHT: 69 IN | WEIGHT: 184.97 LBS | RESPIRATION RATE: 20 BRPM | TEMPERATURE: 98 F | HEART RATE: 64 BPM

## 2021-12-27 VITALS
RESPIRATION RATE: 16 BRPM | OXYGEN SATURATION: 98 % | TEMPERATURE: 98 F | SYSTOLIC BLOOD PRESSURE: 150 MMHG | DIASTOLIC BLOOD PRESSURE: 93 MMHG | HEART RATE: 71 BPM

## 2021-12-27 DIAGNOSIS — M20.21 HALLUX RIGIDUS, RIGHT FOOT: Chronic | ICD-10-CM

## 2021-12-27 LAB
-  AMIKACIN: SIGNIFICANT CHANGE UP
-  AZTREONAM: SIGNIFICANT CHANGE UP
-  CEFEPIME: SIGNIFICANT CHANGE UP
-  CEFTAZIDIME: SIGNIFICANT CHANGE UP
-  CIPROFLOXACIN: SIGNIFICANT CHANGE UP
-  GENTAMICIN: SIGNIFICANT CHANGE UP
-  IMIPENEM: SIGNIFICANT CHANGE UP
-  LEVOFLOXACIN: SIGNIFICANT CHANGE UP
-  MEROPENEM: SIGNIFICANT CHANGE UP
-  PIPERACILLIN/TAZOBACTAM: SIGNIFICANT CHANGE UP
-  TOBRAMYCIN: SIGNIFICANT CHANGE UP
ALBUMIN SERPL ELPH-MCNC: 4.7 G/DL — SIGNIFICANT CHANGE UP (ref 3.3–5)
ALP SERPL-CCNC: 67 U/L — SIGNIFICANT CHANGE UP (ref 40–120)
ALT FLD-CCNC: 12 U/L — SIGNIFICANT CHANGE UP (ref 10–45)
ANION GAP SERPL CALC-SCNC: 13 MMOL/L — SIGNIFICANT CHANGE UP (ref 5–17)
APTT BLD: 29.7 SEC — SIGNIFICANT CHANGE UP (ref 27.5–35.5)
AST SERPL-CCNC: 17 U/L — SIGNIFICANT CHANGE UP (ref 10–40)
BASE EXCESS BLDV CALC-SCNC: 1 MMOL/L — SIGNIFICANT CHANGE UP (ref -2–2)
BASOPHILS # BLD AUTO: 0.02 K/UL — SIGNIFICANT CHANGE UP (ref 0–0.2)
BASOPHILS NFR BLD AUTO: 0.4 % — SIGNIFICANT CHANGE UP (ref 0–2)
BILIRUB SERPL-MCNC: 0.2 MG/DL — SIGNIFICANT CHANGE UP (ref 0.2–1.2)
BUN SERPL-MCNC: 19 MG/DL — SIGNIFICANT CHANGE UP (ref 7–23)
CA-I SERPL-SCNC: 1.21 MMOL/L — SIGNIFICANT CHANGE UP (ref 1.15–1.33)
CALCIUM SERPL-MCNC: 9.9 MG/DL — SIGNIFICANT CHANGE UP (ref 8.4–10.5)
CHLORIDE BLDV-SCNC: 105 MMOL/L — SIGNIFICANT CHANGE UP (ref 96–108)
CHLORIDE SERPL-SCNC: 105 MMOL/L — SIGNIFICANT CHANGE UP (ref 96–108)
CO2 BLDV-SCNC: 30 MMOL/L — HIGH (ref 22–26)
CO2 SERPL-SCNC: 24 MMOL/L — SIGNIFICANT CHANGE UP (ref 22–31)
CREAT SERPL-MCNC: 1.06 MG/DL — SIGNIFICANT CHANGE UP (ref 0.5–1.3)
CRP SERPL-MCNC: 8 MG/L — HIGH (ref 0–4)
EOSINOPHIL # BLD AUTO: 0.05 K/UL — SIGNIFICANT CHANGE UP (ref 0–0.5)
EOSINOPHIL NFR BLD AUTO: 0.9 % — SIGNIFICANT CHANGE UP (ref 0–6)
GAS PNL BLDV: 138 MMOL/L — SIGNIFICANT CHANGE UP (ref 136–145)
GAS PNL BLDV: SIGNIFICANT CHANGE UP
GAS PNL BLDV: SIGNIFICANT CHANGE UP
GLUCOSE BLDV-MCNC: 97 MG/DL — SIGNIFICANT CHANGE UP (ref 70–99)
GLUCOSE SERPL-MCNC: 97 MG/DL — SIGNIFICANT CHANGE UP (ref 70–99)
GRAM STN FLD: SIGNIFICANT CHANGE UP
HCO3 BLDV-SCNC: 28 MMOL/L — SIGNIFICANT CHANGE UP (ref 22–29)
HCT VFR BLD CALC: 40 % — SIGNIFICANT CHANGE UP (ref 39–50)
HCT VFR BLDA CALC: 40 % — SIGNIFICANT CHANGE UP (ref 39–51)
HGB BLD CALC-MCNC: 13.4 G/DL — SIGNIFICANT CHANGE UP (ref 12.6–17.4)
HGB BLD-MCNC: 13.2 G/DL — SIGNIFICANT CHANGE UP (ref 13–17)
IMM GRANULOCYTES NFR BLD AUTO: 0.4 % — SIGNIFICANT CHANGE UP (ref 0–1.5)
INR BLD: 1.16 RATIO — SIGNIFICANT CHANGE UP (ref 0.88–1.16)
LACTATE BLDV-MCNC: 1.3 MMOL/L — SIGNIFICANT CHANGE UP (ref 0.7–2)
LYMPHOCYTES # BLD AUTO: 1.41 K/UL — SIGNIFICANT CHANGE UP (ref 1–3.3)
LYMPHOCYTES # BLD AUTO: 26.4 % — SIGNIFICANT CHANGE UP (ref 13–44)
MCHC RBC-ENTMCNC: 28.9 PG — SIGNIFICANT CHANGE UP (ref 27–34)
MCHC RBC-ENTMCNC: 33 GM/DL — SIGNIFICANT CHANGE UP (ref 32–36)
MCV RBC AUTO: 87.7 FL — SIGNIFICANT CHANGE UP (ref 80–100)
METHOD TYPE: SIGNIFICANT CHANGE UP
METHOD TYPE: SIGNIFICANT CHANGE UP
MONOCYTES # BLD AUTO: 0.46 K/UL — SIGNIFICANT CHANGE UP (ref 0–0.9)
MONOCYTES NFR BLD AUTO: 8.6 % — SIGNIFICANT CHANGE UP (ref 2–14)
NEUTROPHILS # BLD AUTO: 3.38 K/UL — SIGNIFICANT CHANGE UP (ref 1.8–7.4)
NEUTROPHILS NFR BLD AUTO: 63.3 % — SIGNIFICANT CHANGE UP (ref 43–77)
NRBC # BLD: 0 /100 WBCS — SIGNIFICANT CHANGE UP (ref 0–0)
P AERUGINOSA DNA BLD POS NAA+NON-PROBE: SIGNIFICANT CHANGE UP
PCO2 BLDV: 53 MMHG — SIGNIFICANT CHANGE UP (ref 42–55)
PH BLDV: 7.33 — SIGNIFICANT CHANGE UP (ref 7.32–7.43)
PLATELET # BLD AUTO: 311 K/UL — SIGNIFICANT CHANGE UP (ref 150–400)
PO2 BLDV: 24 MMHG — LOW (ref 25–45)
POTASSIUM BLDV-SCNC: 6.6 MMOL/L — CRITICAL HIGH (ref 3.5–5.1)
POTASSIUM SERPL-MCNC: 4.3 MMOL/L — SIGNIFICANT CHANGE UP (ref 3.5–5.3)
POTASSIUM SERPL-SCNC: 4.3 MMOL/L — SIGNIFICANT CHANGE UP (ref 3.5–5.3)
PROCALCITONIN SERPL-MCNC: 0.05 NG/ML — SIGNIFICANT CHANGE UP (ref 0.02–0.1)
PROT SERPL-MCNC: 7.5 G/DL — SIGNIFICANT CHANGE UP (ref 6–8.3)
PROTHROM AB SERPL-ACNC: 13.8 SEC — HIGH (ref 10.6–13.6)
RBC # BLD: 4.56 M/UL — SIGNIFICANT CHANGE UP (ref 4.2–5.8)
RBC # FLD: 11.9 % — SIGNIFICANT CHANGE UP (ref 10.3–14.5)
SAO2 % BLDV: 40.9 % — LOW (ref 67–88)
SARS-COV-2 RNA SPEC QL NAA+PROBE: SIGNIFICANT CHANGE UP
SODIUM SERPL-SCNC: 142 MMOL/L — SIGNIFICANT CHANGE UP (ref 135–145)
WBC # BLD: 5.34 K/UL — SIGNIFICANT CHANGE UP (ref 3.8–10.5)
WBC # FLD AUTO: 5.34 K/UL — SIGNIFICANT CHANGE UP (ref 3.8–10.5)

## 2021-12-27 PROCEDURE — 83605 ASSAY OF LACTIC ACID: CPT

## 2021-12-27 PROCEDURE — 86140 C-REACTIVE PROTEIN: CPT

## 2021-12-27 PROCEDURE — 82803 BLOOD GASES ANY COMBINATION: CPT

## 2021-12-27 PROCEDURE — 85730 THROMBOPLASTIN TIME PARTIAL: CPT

## 2021-12-27 PROCEDURE — 82330 ASSAY OF CALCIUM: CPT

## 2021-12-27 PROCEDURE — 71045 X-RAY EXAM CHEST 1 VIEW: CPT | Mod: 26

## 2021-12-27 PROCEDURE — 71045 X-RAY EXAM CHEST 1 VIEW: CPT

## 2021-12-27 PROCEDURE — 85018 HEMOGLOBIN: CPT

## 2021-12-27 PROCEDURE — 96374 THER/PROPH/DIAG INJ IV PUSH: CPT

## 2021-12-27 PROCEDURE — 85014 HEMATOCRIT: CPT

## 2021-12-27 PROCEDURE — U0005: CPT

## 2021-12-27 PROCEDURE — 99291 CRITICAL CARE FIRST HOUR: CPT

## 2021-12-27 PROCEDURE — 80053 COMPREHEN METABOLIC PANEL: CPT

## 2021-12-27 PROCEDURE — 87040 BLOOD CULTURE FOR BACTERIA: CPT

## 2021-12-27 PROCEDURE — 85025 COMPLETE CBC W/AUTO DIFF WBC: CPT

## 2021-12-27 PROCEDURE — 82435 ASSAY OF BLOOD CHLORIDE: CPT

## 2021-12-27 PROCEDURE — 84145 PROCALCITONIN (PCT): CPT

## 2021-12-27 PROCEDURE — 84132 ASSAY OF SERUM POTASSIUM: CPT

## 2021-12-27 PROCEDURE — 84295 ASSAY OF SERUM SODIUM: CPT

## 2021-12-27 PROCEDURE — 85652 RBC SED RATE AUTOMATED: CPT

## 2021-12-27 PROCEDURE — 93005 ELECTROCARDIOGRAM TRACING: CPT

## 2021-12-27 PROCEDURE — 82947 ASSAY GLUCOSE BLOOD QUANT: CPT

## 2021-12-27 PROCEDURE — 85610 PROTHROMBIN TIME: CPT

## 2021-12-27 PROCEDURE — U0003: CPT

## 2021-12-27 PROCEDURE — 99291 CRITICAL CARE FIRST HOUR: CPT | Mod: 25

## 2021-12-27 RX ORDER — CEFEPIME 1 G/1
2000 INJECTION, POWDER, FOR SOLUTION INTRAMUSCULAR; INTRAVENOUS ONCE
Refills: 0 | Status: COMPLETED | OUTPATIENT
Start: 2021-12-27 | End: 2021-12-27

## 2021-12-27 RX ORDER — SODIUM CHLORIDE 9 MG/ML
100 INJECTION, SOLUTION INTRAVENOUS ONCE
Refills: 0 | Status: DISCONTINUED | OUTPATIENT
Start: 2021-12-27 | End: 2021-12-27

## 2021-12-27 RX ADMIN — CEFEPIME 100 MILLIGRAM(S): 1 INJECTION, POWDER, FOR SOLUTION INTRAMUSCULAR; INTRAVENOUS at 20:46

## 2021-12-27 NOTE — ED POST DISCHARGE NOTE - ADDITIONAL DOCUMENTATION
12/27/21: Received call from pts private physician in [place] Dr. Ky King ([phone]), inquiring if we can give pt a dose of IV ABX here and then send him to the hospital in [place] where his surgery was originally. discussed that discretion of pts treatment will be made by the team taking care of him, but pt advised to return to the ED for +Pseudomonas in [place]. Physician asked if team taking care of him can keep him updated on his care. -Josie Stewart PA-C

## 2021-12-27 NOTE — ED PROVIDER NOTE - CLINICAL SUMMARY MEDICAL DECISION MAKING FREE TEXT BOX
66M here for psuedomonas bacteremia to receive one dose of IV abx and continue with levaquin in [place] as per Dr. King. Patient is afebrile VSS. No concern at this time of worsening abscess.

## 2021-12-27 NOTE — ED PROVIDER NOTE - NSFOLLOWUPINSTRUCTIONS_ED_ALL_ED_FT
You were seen due to blood cultures that grew pseudomonas. You were given a dose of IV antibiotics and instructed to continue with the antibiotics given to you on your first discharge from the hospital. It is of the utmost importance that you see your primary care doctor and your foot doctor AS SOON AS POSSIBLE. Please do not delay seeking treatment.    Cellulitis    Cellulitis is a skin infection caused by bacteria. This condition occurs most often in the arms and lower legs but can occur anywhere over the body. Symptoms include redness, swelling, warm skin, tenderness, and chills/fever. If you were prescribed an antibiotic medicine, take it as told by your health care provider. Do not stop taking the antibiotic even if you start to feel better.    SEEK IMMEDIATE MEDICAL CARE IF YOU HAVE ANY OF THE FOLLOWING SYMPTOMS: worsening fever, red streaks coming from affected area, vomiting or diarrhea, or dizziness/lightheadedness.

## 2021-12-27 NOTE — ED PROVIDER NOTE - RAPID ASSESSMENT
67 y/o male presents for call back. patient with recent admission for right foot osteo, mri from 12/25. called for + pseudomonas growing in the blood. denies fever. aside from foot pain in usual state of health. patient nontoxic appearing

## 2021-12-27 NOTE — ED PROVIDER NOTE - PATIENT PORTAL LINK FT
You can access the FollowMyHealth Patient Portal offered by [facility] by registering at the following website: [email or web address]. By joining "[email or web address]’s FollowMyHealth portal, you will also be able to view your health information using other applications (apps) compatible with our system.

## 2021-12-27 NOTE — ED PROVIDER NOTE - OBJECTIVE STATEMENT
As above. Patient called back to ED for pseudomonas bacteremia, afebrile VSS, purulence at wound site improving, pain at wound site improving, patient without any new or worsening localizing signs or symptoms of infection. As pwer discussion with PCP Dr. King, patient to get a dose of IV abx here, timed for flight to [place] where he will complete rest of course and see podiatrist. Sensitivities reviewed and abscess cultures susceptible to levaquin which patient has. Patient not currently interested in inpatient treatment

## 2021-12-27 NOTE — ED POST DISCHARGE NOTE - DETAILS
12/27: patient must return to ED. advised of importance of return to ED ASAP for IV abx. patient demonstrates understanding - Brooke Vasquez PA-C

## 2021-12-27 NOTE — ED ADULT NURSE REASSESSMENT NOTE - NS ED NURSE REASSESS COMMENT FT1
Pt opting to leave hospital tonight, does not want to be admitted, primary residence in [place] and states that his PMD has advised him to come to ER for one IV abx dose and then is okay to fly back to [place] tomorrow

## 2021-12-27 NOTE — ED ADULT NURSE NOTE - NSIMPLEMENTINTERV_GEN_ALL_ED
Implemented All Fall Risk Interventions:  [place] to call system. Call bell, personal items and telephone within reach. Instruct patient to call for assistance. Room bathroom lighting operational. Non-slip footwear when patient is off stretcher. Physically safe environment: no spills, clutter or unnecessary equipment. Stretcher in lowest position, wheels locked, appropriate side rails in place. Provide visual cue, wrist band, yellow gown, etc. Monitor gait and stability. Monitor for mental status changes and reorient to person, place, and time. Review medications for side effects contributing to fall risk. Reinforce activity limits and safety measures with patient and family.

## 2021-12-27 NOTE — ED ADULT NURSE NOTE - OBJECTIVE STATEMENT
Well appearing patient in no distress presents to ER after being called back to hospital for positive blood cultures.  Pt had surgery on R foot   Pt feeling well, denies fevers/chills, denies chest pain, shortness of breath, cough, abdominal pain, nausea, vomiting, diarrhea, urinary symptoms, falls. Well appearing patient in no distress presents to ER after being called back to hospital for positive pseudomonas bacteremia blood cultures.  Pt had surgery on R foot second toe s/p MTP joint replacement in [place] 2-3 weeks ago, was then admitted here with swelling and erythema to the surgical site and received IV abx, d/c home 12/26.  Pt reports that he is currently feeling well, denies fevers/chills, denies chest pain, shortness of breath, cough, abdominal pain, nausea, vomiting, diarrhea, urinary symptoms, falls.

## 2021-12-27 NOTE — ED PROVIDER NOTE - PHYSICAL EXAMINATION
Gen: WDWN, NAD, comfortable appearing   HEENT: PERRLA, EOMI, no nasal discharge, mucous membranes moist, no oropharyngeal edema/erythema/exudates   CV: RRR, +S1/S2, no M/R/G, equal b/l radial pulses 2+  Resp: CTAB, no W/R/R, no increased WOB   MSK/Skin: RLE foot swelling with erythema localized to 1st and 2nd digitswith scant discharge. No crepitus   Neuro: A&Ox4, moving all 4 extremities spontaneously   Psych: appropriate mood

## 2021-12-27 NOTE — ED PROVIDER NOTE - ATTENDING CONTRIBUTION TO CARE
I have personally seen and examined this patient.  I have fully participated in the care of this patient. I performed a substantive portion of the visit including all aspects of the medical decision making. I have reviewed all pertinent clinical information, including history, physical exam, plan and the Resident’s note and agree except as noted. - MD Alfonso.    65 yo M with osteo, sent in for positive blood culture, pelanreo. given the soure and the species, likely true positive, pt is not in sepsis, requesting treatment in [place]. We communicated with PCP in [place], agrees to start IV abx once pt arrives. Pt is given IV cefepime, covers with levofloxacin until establishing the IV abx at home. pt will take flight tomorrow afternoon, any fever or worsening symptoms, pt will return to the hospital. pt has full capacity, rational to balance his quality of life and treatment, not in immitent life threatening condition such as sepsis, I acknowledge pt's autonomy, and I gave all necessary information and support, return precaution. dischage after IV abx.    Upon my evaluation, this patient had a high probability of imminent or life-threatening deterioration due to bacteremia, which required my direct attention, intervention, and personal management.  The patient has a  medical condition that impairs one or more vital organ systems.  Frequent personal assessment and adjustment of medical interventions was performed.      I have personally provided 45 minutes of critical care time exclusive of time spent on separately billable procedures. Time includes review of laboratory data, radiology results, discussion with consultants, patient and family; monitoring for potential decompensation, as well as time spent retrieving data and reviewing the chart and documenting the visit. Interventions were performed as documented above.

## 2021-12-27 NOTE — ED PROVIDER NOTE - NS ED ROS FT
Gen: No fever, normal appetite  Eyes: No eye irritation or discharge  ENT: No ear pain, congestion, sore throat  Resp: No cough or trouble breathing  Cardiovascular: No chest pain or palpitation  Gastroenteric: No nausea/vomiting, diarrhea, constipation  :  No change in urine output; no dysuria  MS: No joint or muscle pain  Skin: purulence at food wound site improved from prior  Neuro: No headache; no abnormal movements  Remainder negative, except as per the HPI

## 2021-12-28 LAB — ERYTHROCYTE [SEDIMENTATION RATE] IN BLOOD: 36 MM/HR — HIGH (ref 0–20)

## 2021-12-29 LAB
-  AMIKACIN: SIGNIFICANT CHANGE UP
-  AZTREONAM: SIGNIFICANT CHANGE UP
-  CEFEPIME: SIGNIFICANT CHANGE UP
-  CEFTAZIDIME: SIGNIFICANT CHANGE UP
-  CIPROFLOXACIN: SIGNIFICANT CHANGE UP
-  GENTAMICIN: SIGNIFICANT CHANGE UP
-  IMIPENEM: SIGNIFICANT CHANGE UP
-  LEVOFLOXACIN: SIGNIFICANT CHANGE UP
-  MEROPENEM: SIGNIFICANT CHANGE UP
-  PIPERACILLIN/TAZOBACTAM: SIGNIFICANT CHANGE UP
-  TOBRAMYCIN: SIGNIFICANT CHANGE UP
CULTURE RESULTS: SIGNIFICANT CHANGE UP
METHOD TYPE: SIGNIFICANT CHANGE UP
ORGANISM # SPEC MICROSCOPIC CNT: SIGNIFICANT CHANGE UP
SPECIMEN SOURCE: SIGNIFICANT CHANGE UP

## 2021-12-30 LAB
CULTURE RESULTS: SIGNIFICANT CHANGE UP
SPECIMEN SOURCE: SIGNIFICANT CHANGE UP

## 2022-01-01 LAB
CULTURE RESULTS: SIGNIFICANT CHANGE UP
SPECIMEN SOURCE: SIGNIFICANT CHANGE UP

## 2022-01-02 LAB
CULTURE RESULTS: SIGNIFICANT CHANGE UP
SPECIMEN SOURCE: SIGNIFICANT CHANGE UP

## 2022-01-03 LAB
CULTURE RESULTS: SIGNIFICANT CHANGE UP
ORGANISM # SPEC MICROSCOPIC CNT: SIGNIFICANT CHANGE UP
ORGANISM # SPEC MICROSCOPIC CNT: SIGNIFICANT CHANGE UP
SPECIMEN SOURCE: SIGNIFICANT CHANGE UP

## 2022-01-21 NOTE — ED CDU PROVIDER DISPOSITION NOTE - NS ED MD EM OBS MULTI DAY STAY
Medication: Dicyclomine (Bentyl)  Dosage: 20 mg  Sig: Take 1 tablet by mouth twice daily  Last Refill: 06/22/2021  Last Office Visit for this diagnosis or CPE/MWV/PREOP: 03/04/2013, however patient been seen multiple times, latest OV 08/12/2021  (Return is not indicated)   Next Appt:  None made  Pertinent labs UTD: Yes      Prescription does not require PDMP check    Patient due for an appt. Provided 1 refill and instructions on bottle to f/u-due for MWV in April 2022.   98709 - High Complexity

## 2022-09-02 NOTE — ED ADULT NURSE NOTE - CAS ELECT INFOMATION PROVIDED
SURGERY SCHEDULING REQUIREMENTS INCLUDE:    Facility:Any  Admission Type: Day Surgery   Special Instructions: Under Fluroscopy  Time Needed: 20 minutes  Anesthesia: Local  Special Equipment: NA     Procedure:   Epidural: (64302) L/S injection   Levels and laterality L4-5 IL MAGDALENA    Dx Code: M54.16  BLOOD THINNER:   Is patient on any blood thinners? No    *If no hold instructions for meds above given to patient, please route message to nurse pool to give instructions*    Last platelet count:   PLT (K/mcL)   Date Value   02/28/2022 166       Diabetic: Yes     BMI Estimated body mass index is 36.81 kg/m² as calculated from the following:    Height as of 8/2/22: 5' 10.5\" (1.791 m).    Weight as of 8/2/22: 118.1 kg (260 lb 4.1 oz). (if greater than 50 cannot be at [facility] or [address] [place] [address])    Current pain score: 8/10  Pain level at best: 8/10  Pain level at worst: 9/10  Pain level with activity: 9/10         close follow up PMD already established/DC instructions

## 2024-09-07 NOTE — ED ADULT NURSE NOTE - SUICIDE SCREENING DEPRESSION
09/07/24                            Clair Corrallivan Katelynn  [address] [facility] [address]    To Whom It May Concern:    This is to certify Clair Pace was evaluated with Wilian Hendrickson MD on 09/07/24 and can return to  9/9/24.     RESTRICTIONS: none            Electronically signed by:  Wilian Hendrickson MD  [facility]  [address] [facility] [address]  Dept Phone: [phone]        Negative

## 2025-01-22 NOTE — ED POST DISCHARGE NOTE - OTHER COMMUNICATION
Universal procedure checklist and airway assessment completed. 12/28/21: Pt returned already for blood culture result. See ED note from 12/27 for full documentation. Abscess cx sensitive to levaquin which pt was sent home on. Appropriate care received. Pt to continue care in [place] as per ED note. - Miguel Angel Benitez PA-C